# Patient Record
Sex: FEMALE | Race: WHITE
[De-identification: names, ages, dates, MRNs, and addresses within clinical notes are randomized per-mention and may not be internally consistent; named-entity substitution may affect disease eponyms.]

---

## 2017-08-20 ENCOUNTER — HOSPITAL ENCOUNTER (EMERGENCY)
Dept: HOSPITAL 46 - ED | Age: 62
Discharge: HOME | End: 2017-08-20
Payer: MEDICARE

## 2017-08-20 VITALS — HEIGHT: 65 IN | BODY MASS INDEX: 48.82 KG/M2 | WEIGHT: 293 LBS
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
## 2017-08-20 DIAGNOSIS — I10: ICD-10-CM

## 2017-08-20 DIAGNOSIS — Z90.49: ICD-10-CM

## 2017-08-20 DIAGNOSIS — F17.200: ICD-10-CM

## 2017-08-20 DIAGNOSIS — Z88.2: ICD-10-CM

## 2017-08-20 DIAGNOSIS — E11.9: ICD-10-CM

## 2017-08-20 DIAGNOSIS — Z79.899: ICD-10-CM

## 2017-08-20 DIAGNOSIS — Z98.51: ICD-10-CM

## 2017-08-20 DIAGNOSIS — N39.0: ICD-10-CM

## 2017-08-20 DIAGNOSIS — J45.909: ICD-10-CM

## 2017-08-20 DIAGNOSIS — Z79.4: ICD-10-CM

## 2017-08-20 DIAGNOSIS — Z79.82: ICD-10-CM

## 2017-08-20 DIAGNOSIS — S89.91XA: Primary | ICD-10-CM

## 2017-08-20 PROCEDURE — 0T9B70Z DRAINAGE OF BLADDER WITH DRAINAGE DEVICE, VIA NATURAL OR ARTIFICIAL OPENING: ICD-10-PCS

## 2018-05-30 ENCOUNTER — HOSPITAL ENCOUNTER (EMERGENCY)
Dept: HOSPITAL 46 - ED | Age: 63
Discharge: HOME | End: 2018-05-30
Payer: MEDICARE

## 2018-05-30 VITALS — BODY MASS INDEX: 48.82 KG/M2 | WEIGHT: 293 LBS | HEIGHT: 65 IN

## 2018-05-30 DIAGNOSIS — Z79.899: ICD-10-CM

## 2018-05-30 DIAGNOSIS — Z79.4: ICD-10-CM

## 2018-05-30 DIAGNOSIS — Z79.82: ICD-10-CM

## 2018-05-30 DIAGNOSIS — Z88.2: ICD-10-CM

## 2018-05-30 DIAGNOSIS — G89.29: Primary | ICD-10-CM

## 2018-05-30 DIAGNOSIS — J45.909: ICD-10-CM

## 2018-05-30 DIAGNOSIS — F17.200: ICD-10-CM

## 2018-05-30 DIAGNOSIS — M25.552: ICD-10-CM

## 2018-05-30 DIAGNOSIS — E11.9: ICD-10-CM

## 2018-05-30 DIAGNOSIS — I10: ICD-10-CM

## 2018-07-11 ENCOUNTER — HOSPITAL ENCOUNTER (EMERGENCY)
Dept: HOSPITAL 46 - ED | Age: 63
Discharge: HOME | End: 2018-07-11
Payer: MEDICARE

## 2018-07-11 VITALS — BODY MASS INDEX: 48.82 KG/M2 | HEIGHT: 65 IN | WEIGHT: 293 LBS

## 2018-07-11 DIAGNOSIS — N39.0: Primary | ICD-10-CM

## 2018-07-11 DIAGNOSIS — Z79.899: ICD-10-CM

## 2018-07-11 DIAGNOSIS — Z88.2: ICD-10-CM

## 2018-07-11 DIAGNOSIS — Z79.84: ICD-10-CM

## 2018-07-11 DIAGNOSIS — E11.9: ICD-10-CM

## 2018-07-11 DIAGNOSIS — J45.909: ICD-10-CM

## 2018-07-11 DIAGNOSIS — F17.200: ICD-10-CM

## 2018-07-11 DIAGNOSIS — I10: ICD-10-CM

## 2018-07-11 DIAGNOSIS — J02.9: ICD-10-CM

## 2018-07-11 DIAGNOSIS — Z79.4: ICD-10-CM

## 2018-07-11 DIAGNOSIS — Z79.82: ICD-10-CM

## 2019-01-22 ENCOUNTER — HOSPITAL ENCOUNTER (EMERGENCY)
Dept: HOSPITAL 46 - ED | Age: 64
Discharge: HOME | End: 2019-01-22
Payer: MEDICARE

## 2019-01-22 VITALS — HEIGHT: 65 IN | BODY MASS INDEX: 48.82 KG/M2 | WEIGHT: 293 LBS

## 2019-01-22 DIAGNOSIS — Z79.82: ICD-10-CM

## 2019-01-22 DIAGNOSIS — Z79.899: ICD-10-CM

## 2019-01-22 DIAGNOSIS — E11.9: ICD-10-CM

## 2019-01-22 DIAGNOSIS — I10: ICD-10-CM

## 2019-01-22 DIAGNOSIS — Z79.4: ICD-10-CM

## 2019-01-22 DIAGNOSIS — J40: Primary | ICD-10-CM

## 2019-01-22 DIAGNOSIS — F17.200: ICD-10-CM

## 2019-01-22 DIAGNOSIS — G47.30: ICD-10-CM

## 2019-01-22 DIAGNOSIS — Z88.2: ICD-10-CM

## 2019-12-09 NOTE — NUR
PT CALLED, UP TO BATHROOM WITH SBA.  PT STATES SHE IS FEELING SLIGHTLY SOB,
STATES "WHEN I GET HOT, IT MAKES ME COUGH."  PERSONAL FAN PROVIDED FOR
PATIENT.  SATS REMAIN 98% ON RA.  HR NO LONGER IN AFIB, NOW SINUS ZAHIDA WITH
OCCASIONAL PAC'S.  HR:50'S.

## 2019-12-09 NOTE — NUR
PT ARRIVED TO ROOM 127 @2015 VIA STRETCHER.  PT AMBULATED TO BATHROOM AND THEN
TO BED.  PT STEADY ON FEET.  PT STATES THAT SHE HAS CHRONIC NECK AND BACK
PAIN, DENIES ANY ACUTE PAIN AT THIS TIME.  LUNGS SOUND CLEAR/DIM, SATS %
ON ROOM AIR.  DENIES CHEST PAIN AND SOB.  R.T. IN TO GIVE BREATHING TREATMENT.
MEDS TAKEN WITHOUT ISSUE, SANDWICH BOX PROVIDED.

## 2019-12-10 NOTE — NUR
Patient ambulated to toilet, voids 350 mls. Patient reports feeling "shaky and
fuzzy, just like when my blood sugar is low." BS of 214. Vital signs taken,
blood pressure of 76/45 (54) and a pulse of 50. Dr. Newton notified.

## 2019-12-10 NOTE — NUR
ASSESSMENT COMPLETED, SEE DOCUMENTATION.  LUNGS SOUND CLEAR/DIM, MORE AERATION
NOTED COMPARED TO LAST NIGHT.  DENIES CHEST PAIN AND SOB.  IV PATENT, SALINE
LOCKED.  EVENING MEDICATIONS TAKEN WITHOUT ISSUE.  VITAL SIGNS STABLE.  PT
DENIES NEEDS, CALL LIGHT WITHIN REACH.  DISCUSSED PLAN OF CARE FOR THE NIGHT,
QUESTIONS ANSWERED.

## 2019-12-10 NOTE — NUR
PT CALLED, UP TO BATHROOM TO VOID, PT AMBULATES WITHOUT ASSISTANCE ONCE CABLES
ARE UNPLUGGED.  PT CONTINUES TO DENY PAIN.  LUNGS REMAIN CLEAR/DIM, ROOM AIR.
HR SINUS RHYTHM, OCCASIONAL PAC'S, RATE 60'S.  STANDING WEIGHT OBTAINED.  CALL
LIGHT REMAINS WITHIN REACH, PT DENIES REQUESTS.

## 2019-12-10 NOTE — NUR
Patient ambulated to toilet with 2 person SBA. Patient educated on importance
of "taking it slow" while walking. BM produced. Patient returned to bed with 1
person SBA. Patient states, "I feel less fuzzy than I did before." Denies
further needs at this time, call light within reach.

## 2019-12-10 NOTE — NUR
BLOOD PRESSURES HAVE CONTINUED TO RUN LOW. DR. APPLE UPDATED AGAIN AND 1300
DOSE OF LASIX STILL BEING HELD. 1500 LABS TO BE DRAWN. PT RESTING AND DENIES
FURTHER NEEDS. LAST /43. CONTINUE TO MONITOR.

## 2019-12-10 NOTE — NUR
Patient laying in bed watching tv, alert and awake. Ambulated independently to
toilet, voided 350 mls. Denies SOB or dizziness with ambulation. Returns to
bed independently. Vital signs taken, assessment complete. Lung sounds
diminished throughout all lobes. 2+ edema noted in bilateral lower
extremities. Ice chips provided, denies further needs at this time. Call light
within reach.

## 2019-12-10 NOTE — NUR
Patient laying in bed, lunch delivered. Patient sits up at the edge of the bed
with feet on floor. Vitals taken, blood pressure of 109/50 (65) and pulse of
54. 3 units of insulin given with meal. Patient states "I don't feel fuzzy
anymore." Denies further needs at this time, call light within reach.

## 2019-12-10 NOTE — NUR
ASSESSMENT COMPLETED, SEE DOCUMENTATION.  PT STATES SHE NO LONGER FEELS SOB,
STATES "ONLY WHEN I COUGH."  DENIES CHEST PAIN.  LUNGS REMAIN CLEAR/DIM SPO2
94-95% ON RA.  HR REMAINS SINUS ZAHIDA, RATE 54, WITH OCCASIONAL PAC'S.  PT
DENIES REQUESTS AT THIS TIME, CALL LIGHT WITHIN REACH.

## 2019-12-10 NOTE — NUR
SHIFT REPORT RECEIVED FROM BONY COBB.  PT CALLED, UP TO BATHROOM WITHOUT
ASSISTANCE ONCE CORDS WERE UNPLUGGED, STEADY ON FEET.  PT SLIGHTLY SOB WITH
ACTIVITY, RETURNED TO BED.  PT DENIES PAIN AT THIS TIME.  CALL LIGHT WITHIN
REACH, DENIES REQUESTS AT THIS TIME.

## 2019-12-10 NOTE — NUR
Patient sitting up at edge of bed with feet on floor. AM medications given. IV
site patent. IV magnesium running at 50 mls/hr. Patient denies further needs
at this time, call light within reach.

## 2019-12-10 NOTE — EKG
[facility]
                                    [address]
                                  [place]  [address]
_________________________________________________________________________________________
                                                                 Signed   
 
 
Atrial fibrillation
Low voltage QRS
Cannot rule out Anterior infarct , age undetermined
Abnormal ECG
When compared with ECG of 09-JUN-2017 11:35,
Atrial fibrillation has replaced Sinus rhythm
Minimal criteria for Anterior infarct are now present
Nonspecific T wave abnormality now evident in Inferior leads
Confirmed by REESE APPLE MD ([id number]) on 12/10/2019 7:39:42 AM
 
 
 
 
 
 
 
 
 
 
 
 
 
 
 
 
 
 
 
 
 
 
 
 
 
 
 
 
 
 
 
 
 
 
 
 
    Electronically Signed By: REESE APPLE MD  12/10/19 0740
_________________________________________________________________________________________
PATIENT NAME:     NATHANIEL SALINAS                
MEDICAL RECORD #: [id number]                     Electrocardiogram             
          ACCT #: [id number]  
DATE OF BIRTH:   [birth year]                                       
PHYSICIAN:   REESE APPLE MD                   REPORT #: [id number]
REPORT IS CONFIDENTIAL AND NOT TO BE RELEASED WITHOUT AUTHORIZATION

## 2019-12-10 NOTE — NUR
PATIENT FINISHING HER DESSERT AND TOLERATED DINNER WELL. CHF AND AFIB
EDUCATION PROVIDED AT LENGTH. PT ALSO HAD A GOOD VISIT WITH CHF EDUCATOR. PT'S
SISTER IN ROOM AND ATTENTIVE TO PATIENT. PT'S WEIGHT 302.1 LB AT THIS TIME.

## 2019-12-10 NOTE — NUR
Patient on telephone, sitting up at edge of bed with feet on floor. Saline
locked. 100% of breakfast eaten. Cardiac nurse in room to assess, will return
this afternoon. Patient denies needs at this time, call light within reach.

## 2019-12-10 NOTE — NUR
PATIENT RESTING IN BED AT THIS TIME. PT DENIES FEELING "FUZZY" OR LIGHTHEADED
AT THIS TIME, BUT BLOOD PRESSURES ARE STILL LOW. 1300 DOSE OF LASIX BEING
HELD. WILL CONTINUE TO MONITOR CLOSELY.

## 2019-12-10 NOTE — NUR
Certified Heart Failure Nurse Notes:
Consultation recieved for CHF education. Two unsuccessful attempts to visit
with patient today. Patient known to this service- had initiated cardiac
rehabilitation in July for CABG and MI. Attended a few session patient states
dropped out of program due to agraphobia.  At that time PHQ-9 score was 15.
Will return to talk to patient about heart failure management and to encourage
resuming cardiac rehab if able.
Date of echocardiogram -pending
Admit Wt.:  137.22 kg
Admit BNP: 609

## 2019-12-10 NOTE — NUR
In and spoke with Rhonda.  She lives in [place] in her mom's trailer who is
now .  She lives with her 3 sisters and alternate care for a disabled
sister.  Pt has lost near 100 pounds following CABG in Feb.  Needs CHF
education, started the Cardiac rehab program here, but stopped due to
agoraphobia.  She does cont. to use the foot pedal bike she used there.  She
is unclear what she should eat with CHF and Rn's will enter referral for
Cardiac rehab and dietary. Discussed if pt has seen the Stop light protocol
for CHF and she has, but does not follow currently.  Will need further
education.  Pt states she has long history of asthma, but has not needed home
02 or nebulizer in > 6 months.

## 2019-12-10 NOTE — NUR
Imaging in room to perform ECHO. IV magnesium continues infusing at 50 mls/hr.
Breakfast delivered. Patient denies further needs at this time, call light
within reach.

## 2019-12-10 NOTE — NUR
PT APPEARS TO BE SLEEPING AT THIS TIME.  RESPIRATIONS ARE EVEN AND UNLABORED,
RR: 22, SPO2:94% ON RA, HR:57.  WILL ALLOW FOR REST AND CONTINUE TO MONITOR.

## 2019-12-10 NOTE — NUR
PT AMBULATED TO BATHROOM, VOIDED 400ML AND RETURNED TO BED.  PT TOLERATED
WELL.  NO FURTHER REQUESTS AT THIS TIME.

## 2019-12-11 NOTE — NUR
PT CALLED, UP TO BATHROOM TO VOID 800ML AND THEN RETURNED TO BED, TOLERATED
WELL.  ASSESSMENT COMPLETED, NO CHANGES FROM PREVIOUS.  VITAL SIGNS STABLE.
NO FUTHER REQUESTS, CALL LIGHT WITHIN REACH.

## 2019-12-11 NOTE — NUR
PT SLEEPING SOUNDLY AT THIS TIME, NO APPARENT DISTRESS.  RESPIRATIONS EVEN AND
UNLABORED.  HR: 58, RR: 20.  WILL ALLOW FOR REST AND CONTINUE TO MONITOR.

## 2019-12-11 NOTE — NUR
PT SLEEPING, NO APPARENT DISTRESS.  RESPIRATIONS EVEN AND UNLABORED.  RR:22,
HR:59.  WILL ALLOW FOR REST AND CONTINUE TO MONITOR.

## 2019-12-11 NOTE — NUR
PATIENT UUP AND SITTING AT BEDSIDE THIS AM. DENIES ANY FEELING OF SHORTNESS OF
BREATH OR THE "CHEST TIGHTNESS". PT EAGER TO GO HOME TODAY. PT AMB TO BATHROOM
TO VOID 500 ML. PLAN IS FOR PATIENT TO LIKELY D/C HOME TODAY, AFTER ECHO
RESULTS ARE BACK. BREAKFAST ORDERED. ASSESSMENT COMPLETE. DENIES FURTHER
NEEDS.

## 2019-12-11 NOTE — NUR
ASSESSMENT COMPLETED, NO CHANGES FROM PREVIOUS.  PT UP TO BATHROOM, VOIDED
500ML AND RETURNED TO BED.  PT TOLERATED ACTIVITY WELL.  VITAL SIGNS STABLE.
STANDING WEIGHT THIS MORNIN.4 LBS.  CALL LIGHT WITHIN REACH, PT DENIES
REQUESTS.

## 2019-12-11 NOTE — NUR
PATIENT TO D/C  HOME. PT UP TO BATHROOM TO VOID AGAIN. WAITING ON PHARMACY TO
SEE PATIENT BEFORE D/C.

## 2020-01-31 NOTE — EKG
[facility] [facility]
                                    [address]
                                  [place]  [address]
_________________________________________________________________________________________
                                                                 Signed   
 
 
Normal sinus rhythm
Low voltage QRS
Cannot rule out Anterior infarct (cited on or before 09-DEC-2019)
Abnormal ECG
When compared with ECG of 09-DEC-2019 15:38,
Sinus rhythm has replaced Atrial fibrillation
Confirmed by PEDRO REEDER MD ([id number]) on 1/31/2020 1:31:20 PM
 
 
 
 
 
 
 
 
 
 
 
 
 
 
 
 
 
 
 
 
 
 
 
 
 
 
 
 
 
 
 
 
 
 
 
 
 
 
    Electronically Signed By: PEDRO REEDER MD  01/31/20 1331
_________________________________________________________________________________________
PATIENT NAME:     NATHANIEL SALINAS                
MEDICAL RECORD #: [id number]                     Electrocardiogram             
          ACCT #: [id number]  
DATE OF BIRTH:   [birth year]                                       
PHYSICIAN:   PEDRO REEDER MD           REPORT #: [id number]
REPORT IS CONFIDENTIAL AND NOT TO BE RELEASED WITHOUT AUTHORIZATION
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
## 2021-03-31 NOTE — EKG
[facility]
                                    [address]
                                  [place]  [address]
_________________________________________________________________________________________
                                                                 Signed   
 
 
Atrial fibrillation
Abnormal QRS-T angle, consider primary T wave abnormality
Abnormal ECG
When compared with ECG of 07-MAR-2021 00:05,
Inverted T waves have replaced nonspecific T wave abnormality in Inferior leads
Confirmed by PEDRO REEDER MD ([id number]) on 3/31/2021 1:46:16 PM
 
 
 
 
 
 
 
 
 
 
 
 
 
 
 
 
 
 
 
 
 
 
 
 
 
 
 
 
 
 
 
 
 
 
 
 
 
 
 
    Electronically Signed By: PEDRO REEDER MD  03/31/21 1346
_________________________________________________________________________________________
PATIENT NAME:     NATHANIEL SALINAS                
MEDICAL RECORD #: [id number]                     Electrocardiogram             
          ACCT #: [id number]  
DATE OF BIRTH:   [birth year]                                       
PHYSICIAN:   PEDRO REEDER MD           REPORT #: [id number]
REPORT IS CONFIDENTIAL AND NOT TO BE RELEASED WITHOUT AUTHORIZATION

## 2021-12-20 NOTE — XMS
PreManage Notification: NATHANIEL SALINAS MRN:[id number]
 
Security Information
 
Security Events
No recent Security Events currently on file
 
 
 
CRITERIA MET
------------
- PDMP
 
 
CARE PROVIDERS
-------------------------------------------------------------------------------------
MARILYN COBOS      Internal Medicine: Cardiovascular Disease     12/16/2019-Current
 
PHONE: Unknown
-------------------------------------------------------------------------------------
GAYE CHILDRESS      Physician Assistant     12/16/2019-Current
 
PHONE: [phone]
-------------------------------------------------------------------------------------
 
[place] has no Care Guidelines for this patient.
Care History
Medical/Surgical
03/31/2021    [facility]
 
      - PATIENT HAS A CARDIOLOGIST DR COBOS AT [facility] CARDIOLOGY- [phone] 
      
      - LAST APT WITH CARDIOLOGIST 08/13/2020 NEXT APT SCHEDULED 04/08/2021 
      - RECENT ED VISIT RECORDS SENT TO CARDIOLOGIST FOR REVIEW PER REQUEST.
03/08/2021    [facility]
 
      - PATIENT WAS LAST SEEN BY PCP JANUARY 2021 AND HAD A VIRTUAL VISIT WITH 
      PROVIDER FEBRUARY 2021. PROVIDER OFFICE JUST RECEIVED PATIENT RECENT ED VISIT
      NOTE AND WILL CONTACT PATIENT TODAY FOR A FOLLOW UP APT.
E.DAdrienne VISIT COUNT (12 MO.)
-------------------------------------------------------------------------------------
4 [facility]
-------------------------------------------------------------------------------------
TOTAL 4
-------------------------------------------------------------------------------------
NOTE: Visits indicate total known visits.
 
ED/UCC VISIT TRACKING (12 MO.)
-------------------------------------------------------------------------------------
12/20/2021 17:13
[facility] [place]
 
TYPE: Emergency
 
COMPLAINT:
- VOMITING
-------------------------------------------------------------------------------------
03/30/2021 16:20
[facility] [place]
 
 
TYPE: Emergency
 
COMPLAINT:
- CHEST PAIN
 
DIAGNOSES:
- Other chest pain
- Sleep apnea, unspecified
- Essential (primary) hypertension
- Nicotine dependence, unspecified, uncomplicated
- Other long term (current) drug therapy
- Long term (current) use of anticoagulants
- Long term (current) use of insulin
- Allergy status to sulfonamides
- Nicotine dependence, cigarettes, uncomplicated
- Type 2 diabetes mellitus without complications
-------------------------------------------------------------------------------------
03/06/2021 23:31
[facility] [place]
 
TYPE: Emergency
 
COMPLAINT:
- TIRED, SWEATY, FELL
 
DIAGNOSES:
- Type 2 diabetes mellitus without complications
- Other long term (current) drug therapy
- Long term (current) use of aspirin
- Nicotine dependence, unspecified, uncomplicated
- Essential (primary) hypertension
- Allergy status to sulfonamides
- Syncope and collapse
- Sleep apnea, unspecified
- Long term (current) use of insulin
- Urinary tract infection, site not specified
-------------------------------------------------------------------------------------
01/07/2021 18:57
[facility] [place]
 
TYPE: Emergency
 
COMPLAINT:
- SYNCOPE
 
DIAGNOSES:
- Sleep apnea, unspecified
- Unspecified asthma, uncomplicated
- Long term (current) use of aspirin
- Nicotine dependence, unspecified, uncomplicated
- Fall on same level, unspecified, initial encounter
- Allergy status to sulfonamides
- Unspecified injury of unspecified muscle, fascia and tendon at wrist and hand
level, left hand, initial encounter
- Long term (current) use of insulin
- Other long term (current) drug therapy
- Laceration without foreign body of nose, initial encounter
- Type 2 diabetes mellitus without complications
- Essential (primary) hypertension
 
- Syncope and collapse
-------------------------------------------------------------------------------------
 
 
INPATIENT VISIT TRACKING (12 MO.)
No inpatient visits to display in this time frame
 
[email or web address]

## 2021-12-21 NOTE — EKG
[facility]
                                    [address] [place], [place]  [address]
_________________________________________________________________________________________
                                                                 Signed   
 
 
Atrial fibrillation
Abnormal ECG
When compared with ECG of 30-MAR-2021 16:24,
No significant change was found
Confirmed by PEDRO REEDER MD ([id number]) on 12/21/2021 9:05:43 AM
 
 
 
 
 
 
 
 
 
 
 
 
 
 
 
 
 
 
 
 
 
 
 
 
 
 
 
 
 
 
 
 
 
 
 
 
 
 
 
 
    Electronically Signed By: PEDRO REEDER MD  12/21/21 0905
_________________________________________________________________________________________
PATIENT NAME:     NATHANIEL SALINAS [place]                
MEDICAL RECORD #: [id number]                     Electrocardiogram             
          ACCT #: [id number]  
DATE OF BIRTH:   [birth year]                                       
PHYSICIAN:   PEDRO REEDER MD           REPORT #: [id number]
REPORT IS CONFIDENTIAL AND NOT TO BE RELEASED WITHOUT AUTHORIZATION

## 2022-05-04 NOTE — XMS
PreManage Notification: NATHANIEL SALINAS MRN:[id number]
 
Security Information
 
Security Events
No recent Security Events currently on file
 
 
 
CRITERIA MET
------------
- PDMP
 
 
CARE PROVIDERS
-------------------------------------------------------------------------------------
MARILYN COBOS      Internal Medicine: Cardiovascular Disease     12/16/2019-Current
 
PHONE: Unknown
-------------------------------------------------------------------------------------
GAYE CHILDRESS      Physician Assistant     12/21/2021-Current
 
PHONE: [phone]
-------------------------------------------------------------------------------------
 
[place] has no Care Guidelines for this patient.
Care History
Medical/Surgical
03/31/2021    [facility]
 
      - PATIENT HAS A CARDIOLOGIST DR COBOS AT [facility] CARDIOLOGY- [phone] 
      
      - LAST APT WITH CARDIOLOGIST 08/13/2020 NEXT APT SCHEDULED 04/08/2021 
      - RECENT ED VISIT RECORDS SENT TO CARDIOLOGIST FOR REVIEW PER REQUEST.
03/08/2021    [facility]
 
      - PATIENT WAS LAST SEEN BY PCP JANUARY 2021 AND HAD A VIRTUAL VISIT WITH 
      PROVIDER FEBRUARY 2021. PROVIDER OFFICE JUST RECEIVED PATIENT RECENT ED VISIT
      NOTE AND WILL CONTACT PATIENT TODAY FOR A FOLLOW UP APT.
E.DAdrienne VISIT COUNT (12 MO.)
-------------------------------------------------------------------------------------
2 [facility]
-------------------------------------------------------------------------------------
TOTAL 2
-------------------------------------------------------------------------------------
NOTE: Visits indicate total known visits.
 
ED/UCC VISIT TRACKING (12 MO.)
-------------------------------------------------------------------------------------
05/04/2022 09:35
[facility] [place]
 
TYPE: Emergency
 
COMPLAINT:
- POST SURGERY BLEEDING
-------------------------------------------------------------------------------------
12/20/2021 17:13
[facility] [place]
 
 
TYPE: Emergency
 
COMPLAINT:
- VOMITING
 
DIAGNOSES:
- Unspecified asthma, uncomplicated
- Unspecified atrial fibrillation
- Essential (primary) hypertension
- Other long term (current) drug therapy
- Nicotine dependence, unspecified, uncomplicated
- Epigastric pain
- Long term (current) use of oral hypoglycemic drugs
- Right upper quadrant pain
- Allergy status to sulfonamides
- Type 2 diabetes mellitus without complications
- Sleep apnea, unspecified
- Headache, unspecified
- Long term (current) use of insulin
- Long term (current) use of anticoagulants
-------------------------------------------------------------------------------------
 
 
INPATIENT VISIT TRACKING (12 MO.)
-------------------------------------------------------------------------------------
05/03/2022 06:50
[facility] [place]
 
TYPE: Surgery
 
DIAGNOSES:
- Primary osteoarthritis, right shoulder
-------------------------------------------------------------------------------------
 
[email or web address]

## 2022-05-19 NOTE — XMS
PreManage Notification: NATHANIEL SALINAS MRN:[id number]
 
Security Information
 
Security Events
No recent Security Events currently on file
 
 
 
CRITERIA MET
------------
- [facility]
- [facility] - 2 Visits in 30 Days
 
 
CARE PROVIDERS
-------------------------------------------------------------------------------------
MARILYN COBOS      Internal Medicine: Cardiovascular Disease     12/16/2019-Current
 
PHONE: Unknown
-------------------------------------------------------------------------------------
GAYE CHILDRESS      Physician Assistant     12/21/2021-Current
 
PHONE: [phone]
-------------------------------------------------------------------------------------
 
Minor has no Care Guidelines for this patient.
Care History
Medical/Surgical
03/31/2021    [facility]
 
      - PATIENT HAS A CARDIOLOGIST DR COBOS AT [facility] CARDIOLOGY- [phone] 
      
      - LAST APT WITH CARDIOLOGIST 08/13/2020 NEXT APT SCHEDULED 04/08/2021 
      - RECENT ED VISIT RECORDS SENT TO CARDIOLOGIST FOR REVIEW PER REQUEST.
03/08/2021    [facility]
 
      - PATIENT WAS LAST SEEN BY PCP JANUARY 2021 AND HAD A VIRTUAL VISIT WITH 
      PROVIDER FEBRUARY 2021. PROVIDER OFFICE JUST RECEIVED PATIENT RECENT ED VISIT
      NOTE AND WILL CONTACT PATIENT TODAY FOR A FOLLOW UP APT.
E.D. VISIT COUNT (12 MO.)
-------------------------------------------------------------------------------------
3 [facility]
-------------------------------------------------------------------------------------
TOTAL 3
-------------------------------------------------------------------------------------
NOTE: Visits indicate total known visits.
 
ED/UCC VISIT TRACKING (12 MO.)
-------------------------------------------------------------------------------------
05/19/2022 03:15
[facility] [place]
 
TYPE: Emergency
 
COMPLAINT:
- POST OP PROBLEM
-------------------------------------------------------------------------------------
05/04/2022 09:35
 
[facility] [place]
 
TYPE: Emergency
 
COMPLAINT:
- POST SURGERY BLEEDING
 
DIAGNOSES:
- Allergy status to sulfonamides
- Nicotine dependence, unspecified, uncomplicated
- Postprocedural hemorrhage of a musculoskeletal structure following a
musculoskeletal system procedure
- Type 2 diabetes mellitus without complications
- Long term (current) use of anticoagulants
- Long term (current) use of aspirin
- Essential (primary) hypertension
- Long term (current) use of oral hypoglycemic drugs
- Long term (current) use of insulin
- Other long term (current) drug therapy
- Unspecified atrial fibrillation
- Sleep apnea, unspecified
-------------------------------------------------------------------------------------
12/20/2021 17:13
[facility] [place]
 
TYPE: Emergency
 
COMPLAINT:
- VOMITING
 
DIAGNOSES:
- Unspecified asthma, uncomplicated
- Unspecified atrial fibrillation
- Essential (primary) hypertension
- Other long term (current) drug therapy
- Nicotine dependence, unspecified, uncomplicated
- Epigastric pain
- Long term (current) use of oral hypoglycemic drugs
- Right upper quadrant pain
- Allergy status to sulfonamides
- Type 2 diabetes mellitus without complications
- Sleep apnea, unspecified
- Headache, unspecified
- Long term (current) use of insulin
- Long term (current) use of anticoagulants
-------------------------------------------------------------------------------------
 
 
INPATIENT VISIT TRACKING (12 MO.)
-------------------------------------------------------------------------------------
05/17/2022 13:50
[facility] [place]
 
TYPE: Surgery
 
COMPLAINT:
- SHOULDER ARTHROPLASTY, TOTAL, REVISION
 
DIAGNOSES:
- SHOULDER ARTHROPLASTY, TOTAL, REVISION
 
-------------------------------------------------------------------------------------
05/03/2022 06:50
[facility] [place]
 
TYPE: Surgery
 
DIAGNOSES:
- Primary osteoarthritis, right shoulder
-------------------------------------------------------------------------------------
 
[email or web address]

## 2023-02-03 NOTE — XMS
PreManage Notification: NATHANIEL SALINAS MRN:[id number]
 
Security Information
 
Security Events
No recent Security Events currently on file
 
 
 
CRITERIA MET
------------
- [facility]
 
 
CARE PROVIDERS
-------------------------------------------------------------------------------------
MARILYN COBOS      Internal Medicine: Cardiovascular Disease     12/16/2019-Current
 
PHONE: Unknown
-------------------------------------------------------------------------------------
GAYE CHILDRESS      Physician Assistant     12/21/2021-Current
 
PHONE: [phone]
-------------------------------------------------------------------------------------
 
Minor has no Care Guidelines for this patient.
Care History
Medical/Surgical
03/31/2021    [facility]
 
      - PATIENT HAS A CARDIOLOGIST DR COBOS AT [facility] CARDIOLOGY- [phone] 
      
      - LAST APT WITH CARDIOLOGIST 08/13/2020 NEXT APT SCHEDULED 04/08/2021 
      - RECENT ED VISIT RECORDS SENT TO CARDIOLOGIST FOR REVIEW PER REQUEST.
03/08/2021    [facility]
 
      - PATIENT WAS LAST SEEN BY PCP JANUARY 2021 AND HAD A VIRTUAL VISIT WITH 
      PROVIDER FEBRUARY 2021. PROVIDER OFFICE JUST RECEIVED PATIENT RECENT ED VISIT
      NOTE AND WILL CONTACT PATIENT TODAY FOR A FOLLOW UP APT.
E.DAdrienne VISIT COUNT (12 MO.)
-------------------------------------------------------------------------------------
3 [facility]
-------------------------------------------------------------------------------------
TOTAL 3
-------------------------------------------------------------------------------------
NOTE: Visits indicate total known visits.
 
ED/UCC VISIT TRACKING (12 MO.)
-------------------------------------------------------------------------------------
02/03/2023 10:37
[facility] [place]
 
TYPE: Emergency
 
COMPLAINT:
- COUGH, CHEST PRESSURE, DIZZY
-------------------------------------------------------------------------------------
05/19/2022 03:15
[facility] [place]
 
 
TYPE: Emergency
 
COMPLAINT:
- POST OP PROBLEM
 
DIAGNOSES:
- Other long term (current) drug therapy
- Allergy status to sulfonamides
- Long term (current) use of anticoagulants
- Other complications of procedures, not elsewhere classified, initial encounter
- Type 2 diabetes mellitus without complications
- Long term (current) use of aspirin
- Other intraoperative and postprocedural complications and disorders of the
musculoskeletal system
- Long term (current) use of insulin
- Allergy status to other drugs, medicaments and biological substances
- Sleep apnea, unspecified
- Essential (primary) hypertension
- Nicotine dependence, unspecified, uncomplicated
- Unspecified atrial fibrillation
- Unspecified asthma, uncomplicated
- Restless legs syndrome
-------------------------------------------------------------------------------------
05/04/2022 09:35
[facility] [place]
 
TYPE: Emergency
 
COMPLAINT:
- POST SURGERY BLEEDING
 
DIAGNOSES:
- Sleep apnea, unspecified
- Postprocedural hemorrhage of a musculoskeletal structure following a
musculoskeletal system procedure
- Other long term (current) drug therapy
- Allergy status to sulfonamides
- Long term (current) use of oral hypoglycemic drugs
- Long term (current) use of aspirin
- Type 2 diabetes mellitus without complications
- Unspecified atrial fibrillation
- Nicotine dependence, unspecified, uncomplicated
- Long term (current) use of insulin
- Essential (primary) hypertension
- Long term (current) use of anticoagulants
-------------------------------------------------------------------------------------
 
 
INPATIENT VISIT TRACKING (12 MO.)
-------------------------------------------------------------------------------------
05/17/2022 13:50
[facility] [place]
 
TYPE: Surgery
 
COMPLAINT:
- SHOULDER ARTHROPLASTY, TOTAL, REVISION
 
DIAGNOSES:
 
- SHOULDER ARTHROPLASTY, TOTAL, REVISION
-------------------------------------------------------------------------------------
05/03/2022 06:50
[facility]
 
[facility]: [facility]
 
DIAGNOSES:
- Primary osteoarthritis, right shoulder
-------------------------------------------------------------------------------------
 
[email or web address]

## 2023-02-05 NOTE — EKG
[facility]
                                    [address]
                                  [place]  [address]
_________________________________________________________________________________________
                                                                 Signed   
 
 
Atrial fibrillation
Nonspecific T wave abnormality
Abnormal ECG
When compared with ECG of 20-DEC-2021 19:00,
Nonspecific T wave abnormality now evident in Anterior leads
Confirmed by Levy Campos MD ([id number]) on 2/5/2023 12:22:53 PM
 
 
 
 
 
 
 
 
 
 
 
 
 
 
 
 
 
 
 
 
 
 
 
 
 
 
 
 
 
 
 
 
 
 
 
 
 
 
 
    Electronically Signed By: LEVY CAMPOS MD  02/05/23 1223
_________________________________________________________________________________________
PATIENT NAME:     NATHANIEL SALINAS                
MEDICAL RECORD #: [id number]                     Electrocardiogram             
          ACCT #: [id number]  
DATE OF BIRTH:   [birth year]                                       
PHYSICIAN:   LEVY CAMPOS MD                 REPORT #: [id number]
REPORT IS CONFIDENTIAL AND NOT TO BE RELEASED WITHOUT AUTHORIZATION

## 2023-02-09 NOTE — XMS
PreManage Notification: NATHANIEL SALINAS MRN:[id number]
 
Security Information
 
Security Events
No recent Security Events currently on file
 
 
 
CRITERIA MET
------------
- [facility] - 2 Visits in 30 Days
- [facility]
 
 
CARE PROVIDERS
-------------------------------------------------------------------------------------
MARILYN COBOS      Internal Medicine: Cardiovascular Disease     12/16/2019-Current
 
PHONE: Unknown
-------------------------------------------------------------------------------------
GAYE CHILDRESS      Physician Assistant     12/21/2021-Current
 
PHONE: [phone]
-------------------------------------------------------------------------------------
 
[place]or has no Care Guidelines for this patient.
Care History
Medical/Surgical
03/31/2021    [facility]
 
      - PATIENT HAS A CARDIOLOGIST DR COBOS AT [facility] CARDIOLOGY- [phone] 
      
      - LAST APT WITH CARDIOLOGIST 08/13/2020 NEXT APT SCHEDULED 04/08/2021 
      - RECENT ED VISIT RECORDS SENT TO CARDIOLOGIST FOR REVIEW PER REQUEST.
03/08/2021    [facility]
 
      - PATIENT WAS LAST SEEN BY PCP JANUARY 2021 AND HAD A VIRTUAL VISIT WITH 
      PROVIDER FEBRUARY 2021. PROVIDER OFFICE JUST RECEIVED PATIENT RECENT ED VISIT
      NOTE AND WILL CONTACT PATIENT TODAY FOR A FOLLOW UP APT.
E.D. VISIT COUNT (12 MO.)
-------------------------------------------------------------------------------------
4 [facility]
-------------------------------------------------------------------------------------
TOTAL 4
-------------------------------------------------------------------------------------
NOTE: Visits indicate total known visits.
 
ED/UCC VISIT TRACKING (12 MO.)
-------------------------------------------------------------------------------------
02/09/2023 13:44
[facility] [place]
 
TYPE: Emergency
 
COMPLAINT:
- COUGH
-------------------------------------------------------------------------------------
02/03/2023 10:37
 
[facility] [place]
 
TYPE: Emergency
 
COMPLAINT:
- COUGH, CHEST PRESSURE, DIZZY
 
DIAGNOSES:
- Contact with and (suspected) exposure to COVID-19
- Essential (primary) hypertension
- Unspecified asthma, uncomplicated
- Long term (current) use of aspirin
- Allergy status to other drugs, medicaments and biological substances
- Other long term (current) drug therapy
- Sleep apnea, unspecified
- Unspecified atrial fibrillation
- Cough, unspecified
- Allergy status to sulfonamides
- Nicotine dependence, unspecified, uncomplicated
- Acute upper respiratory infection, unspecified
- Long term (current) use of anticoagulants
- Long term (current) use of insulin
- Type 2 diabetes mellitus without complications
-------------------------------------------------------------------------------------
05/19/2022 03:15
[facility] [place]
 
TYPE: Emergency
 
COMPLAINT:
- POST OP PROBLEM
 
DIAGNOSES:
- Long term (current) use of anticoagulants
- Other complications of procedures, not elsewhere classified, initial encounter
- Type 2 diabetes mellitus without complications
- Long term (current) use of aspirin
- Other intraoperative and postprocedural complications and disorders of the
musculoskeletal system
- Long term (current) use of insulin
- Allergy status to other drugs, medicaments and biological substances
- Sleep apnea, unspecified
- Essential (primary) hypertension
- Nicotine dependence, unspecified, uncomplicated
- Unspecified atrial fibrillation
- Unspecified asthma, uncomplicated
- Restless legs syndrome
- Other long term (current) drug therapy
- Allergy status to sulfonamides
-------------------------------------------------------------------------------------
05/04/2022 09:35
[facility] [place]
 
TYPE: Emergency
 
COMPLAINT:
- POST SURGERY BLEEDING
 
DIAGNOSES:
- Other long term (current) drug therapy
 
- Allergy status to sulfonamides
- Long term (current) use of oral hypoglycemic drugs
- Long term (current) use of aspirin
- Type 2 diabetes mellitus without complications
- Unspecified atrial fibrillation
- Nicotine dependence, unspecified, uncomplicated
- Long term (current) use of insulin
- Essential (primary) hypertension
- Long term (current) use of anticoagulants
- Sleep apnea, unspecified
- Postprocedural hemorrhage of a musculoskeletal structure following a
musculoskeletal system procedure
-------------------------------------------------------------------------------------
 
 
INPATIENT VISIT TRACKING (12 MO.)
-------------------------------------------------------------------------------------
05/17/2022 13:50
[facility] [place]
 
TYPE: Surgery
 
COMPLAINT:
- SHOULDER ARTHROPLASTY, TOTAL, REVISION
 
DIAGNOSES:
- SHOULDER ARTHROPLASTY, TOTAL, REVISION
-------------------------------------------------------------------------------------
05/03/2022 06:50
[facility] [place]
 
TYPE: Surgery
 
DIAGNOSES:
- Primary osteoarthritis, right shoulder
-------------------------------------------------------------------------------------
 
[email or web address]

## 2023-02-20 NOTE — XMS
PreManage Notification: NATHANIEL SALINAS MRN:[id number]
 
Security Information
 
Security Events
No recent Security Events currently on file
 
 
 
CRITERIA MET
------------
- [facility] - 2 Visits in 30 Days
- [facility]
 
 
CARE PROVIDERS
-------------------------------------------------------------------------------------
MARILYN COBOS      Internal Medicine: Cardiovascular Disease     12/16/2019-Current
 
PHONE: Unknown
-------------------------------------------------------------------------------------
GAYE CHILDRESS      Physician Assistant     12/21/2021-Current
 
PHONE: [phone]
-------------------------------------------------------------------------------------
 
Min[place] has no Care Guidelines for this patient.
Care History
Medical/Surgical
03/31/2021    [facility]
 
      - PATIENT HAS A CARDIOLOGIST DR COBOS AT [facility] CARDIOLOGY- [phone] 
      
      - LAST APT WITH CARDIOLOGIST 08/13/2020 NEXT APT SCHEDULED 04/08/2021 
      - RECENT ED VISIT RECORDS SENT TO CARDIOLOGIST FOR REVIEW PER REQUEST.
03/08/2021    [facility]
 
      - PATIENT WAS LAST SEEN BY PCP JANUARY 2021 AND HAD A VIRTUAL VISIT WITH 
      PROVIDER FEBRUARY 2021. PROVIDER OFFICE JUST RECEIVED PATIENT RECENT ED VISIT
      NOTE AND WILL CONTACT PATIENT TODAY FOR A FOLLOW UP APT.
E.D. VISIT COUNT (12 MO.)
-------------------------------------------------------------------------------------
5 [facility]
-------------------------------------------------------------------------------------
TOTAL 5
-------------------------------------------------------------------------------------
NOTE: Visits indicate total known visits.
 
ED/UCC VISIT TRACKING (12 MO.)
-------------------------------------------------------------------------------------
02/20/2023 21:42
[facility] [place]
 
TYPE: Emergency
 
COMPLAINT:
- CONFUSION,WEAKNESS
-------------------------------------------------------------------------------------
02/09/2023 13:44
 
[facility] [place]
 
TYPE: Emergency
 
COMPLAINT:
- COUGH
 
DIAGNOSES:
- Chronic obstructive pulmonary disease with (acute) exacerbation
- Cough, unspecified
- Other long term (current) drug therapy
- Essential (primary) hypertension
- Allergy status to sulfonamides
- Long term (current) use of aspirin
- Long term (current) use of insulin
- Sleep apnea, unspecified
- Type 2 diabetes mellitus without complications
- Allergy status to other drugs, medicaments and biological substances
- Nicotine dependence, unspecified, uncomplicated
-------------------------------------------------------------------------------------
02/03/2023 10:37
[facility] [place]
 
TYPE: Emergency
 
COMPLAINT:
- COUGH, CHEST PRESSURE, DIZZY
 
DIAGNOSES:
- Essential (primary) hypertension
- Unspecified asthma, uncomplicated
- Long term (current) use of aspirin
- Allergy status to other drugs, medicaments and biological substances
- Other long term (current) drug therapy
- Sleep apnea, unspecified
- Unspecified atrial fibrillation
- Cough, unspecified
- Allergy status to sulfonamides
- Nicotine dependence, unspecified, uncomplicated
- Acute upper respiratory infection, unspecified
- Long term (current) use of anticoagulants
- Long term (current) use of insulin
- Type 2 diabetes mellitus without complications
- Contact with and (suspected) exposure to COVID-19
-------------------------------------------------------------------------------------
05/19/2022 03:15
[facility] [place]
 
TYPE: Emergency
 
COMPLAINT:
- POST OP PROBLEM
 
DIAGNOSES:
- Other complications of procedures, not elsewhere classified, initial encounter
- Type 2 diabetes mellitus without complications
- Long term (current) use of aspirin
- Other intraoperative and postprocedural complications and disorders of the
musculoskeletal system
- Long term (current) use of insulin
 
- Allergy status to other drugs, medicaments and biological substances
- Sleep apnea, unspecified
- Essential (primary) hypertension
- Nicotine dependence, unspecified, uncomplicated
- Unspecified atrial fibrillation
- Unspecified asthma, uncomplicated
- Restless legs syndrome
- Other long term (current) drug therapy
- Allergy status to sulfonamides
- Long term (current) use of anticoagulants
-------------------------------------------------------------------------------------
05/04/2022 09:35
[facility] [place]
 
TYPE: Emergency
 
COMPLAINT:
- POST SURGERY BLEEDING
 
DIAGNOSES:
- Allergy status to sulfonamides
- Long term (current) use of oral hypoglycemic drugs
- Long term (current) use of aspirin
- Type 2 diabetes mellitus without complications
- Unspecified atrial fibrillation
- Nicotine dependence, unspecified, uncomplicated
- Long term (current) use of insulin
- Essential (primary) hypertension
- Long term (current) use of anticoagulants
- Sleep apnea, unspecified
- Postprocedural hemorrhage of a musculoskeletal structure following a
musculoskeletal system procedure
- Other long term (current) drug therapy
-------------------------------------------------------------------------------------
 
 
INPATIENT VISIT TRACKING (12 MO.)
-------------------------------------------------------------------------------------
05/17/2022 13:50
[facility] [place]
 
TYPE: Surgery
 
COMPLAINT:
- SHOULDER ARTHROPLASTY, TOTAL, REVISION
 
DIAGNOSES:
- SHOULDER ARTHROPLASTY, TOTAL, REVISION
-------------------------------------------------------------------------------------
05/03/2022 06:50
[facility] [place]
 
TYPE: Surgery
 
DIAGNOSES:
- Primary osteoarthritis, right shoulder
-------------------------------------------------------------------------------------
 
[email or web address]

## 2023-02-21 NOTE — NUR
pt SLEEPING, AWAKENS TO VOICE. VSS. DENIES TOILETING NEEDS. INSTRUCTED TO CALL
BEFORE OUT OF BED. CALL LIGHT IN REACH. BED ALARM ON.

## 2023-02-21 NOTE — NUR
IN ROOM WITH PT. MORNING MEDICATION GIVEN. PT RESTING IN BED WORKING WITH
OCCUPATIONAL THERAPIST IRINEO. PT HAS CALL LIGHT WITHIN REACH AND BEDRAIL UP FOR
SAFETY. PT DENIES NEEDS AT THIS TIME.

## 2023-02-21 NOTE — NUR
call light answered, pt reports pain in her feet and states, "it feels like
there's no cushion in my feet". pt medicated by primary rn regarding
neuropathy/rls, unable to have prn tylenol until approx 2300, pt verbalized
understanding. pt declined warm blankets wrapping ble, stating, "it hurts if
anything touches them". ble elevated in bed with pillows x2 and warm blanket
provided with lower portion hanging over the foot of the bed as not to touch
pt's feet. no additional needs, bed alarm on and primary rn aware. call light
in reach. prn melatonin offered, but declined.

## 2023-02-21 NOTE — NUR
PATIENT LAYING IN BED. VITALS AND I&O'S CHARTED. PATIENT COMPLAINED OF HER
LEGS ACHING AND ASKED IF THEIR WAS ANYTHING SHE COULD HAVE FOR THAT, RN
NOTIFIED. CALL LIGHT IN REACH. NO FURTHER NEEDS AT THIS TIME.

## 2023-02-21 NOTE — EKG
[facility]
                                    [address]
                                  [place]  [address]
_________________________________________________________________________________________
                                                                 Signed   
 
 
Atrial fibrillation
Low voltage QRS
Nonspecific ST and T wave abnormality
Abnormal ECG
When compared with ECG of 03-FEB-2023 12:02,
T wave inversion now evident in Lateral leads
Confirmed by Levy Campos MD ([id number]) on 2/21/2023 9:04:49 AM
 
 
 
 
 
 
 
 
 
 
 
 
 
 
 
 
 
 
 
 
 
 
 
 
 
 
 
 
 
 
 
 
 
 
 
 
 
 
    Electronically Signed By: LEVY CAMPOS MD  02/21/23 0904
_________________________________________________________________________________________
PATIENT NAME:     NATHANIEL SALINAS                
MEDICAL RECORD #: [id number]                     Electrocardiogram             
          ACCT #: [id number]  
DATE OF BIRTH:   [birth year]                                       
PHYSICIAN:   LEVY CAMPOS MD                 REPORT #: [id number]
REPORT IS CONFIDENTIAL AND NOT TO BE RELEASED WITHOUT AUTHORIZATION

## 2023-02-21 NOTE — NUR
PATIENT ASSESMENT COMPLETED. PATIENTS VITALS TAKEN AND RECORDED. INTAKE AND
OUTPUT RECORDED. PATIENTS BS TAKEN AND RECORDED. PATIENTS PM MEDS GIVEN PER
ORDER. PATIENT REPORTS PAIN IN HER LOW EXT THAT IS CHRONIC, SHEDULED MEDS
GIVEN PER ORDER. PATIENTS IV FLUSHED AND SL PER ORDER. PATIENT PROVIDED WITH
FRESH ICE WATER. PATIENT DENIES ANY FURTHER NEEDS NOTED. CALL LIGHT IN REACH.
PATIENT IS FORGETFUL AT TIMES. BED ALARM PLACED ON FOR SAFETY.

## 2023-02-21 NOTE — NUR
RECEIVED REPORT FROM DAY SHIFT RN. PATIENT IS RESTING IN BED WATCHING TV.
PATIENT DENIES ANY NEEDS AT THIS TIME. CALL LIGHT IN REACH.

## 2023-02-21 NOTE — NUR
PT SITTING ON BED talking on phone. pt states she feels like she might have
rice stuck where her uvula removed. states she will gargle with her water.

## 2023-02-21 NOTE — NUR
CHECKED ON pt. RESTING IN BED AWAKE. ASSISTED pt WITH TV CHANNELS, GUIDE
PROVIDED. LIGHTS OFF IN ROOM. NO ADDITIONAL REQUESTS. BED ALARM ON.

## 2023-02-21 NOTE — NUR
pt ARRIVES FROM ER VIA WHEELCHAIR WITH ER RN. AMBULATORY TO STANDING SCALE AND
HOSPITAL BED. ASSESSMENT COMPLETE. ALERT AND ORIENTED. NIH SCORE 1. ATAXIA
LEFT ARM. BEDSIDE SWALLOW EVAL WNL. PO SNACK PROVIDED. BED ALARM ON. pt
ORIENTED TO CALL LIGHT AND HOSPITAL BED, ROOM.

## 2023-02-21 NOTE — NUR
ADMININSTER INSULIN WITH RN STUDENT UMA. PT TOLERATED WELL AND STATES THAT
SHE HAS NOT FELT WELL ENOUGH THE LAST FEW DAYS TO TAKE HER INSULIN. EXPLAINED
THAT HER A1C IS EXCEPTIONALLY HIGH AND THAT WOULD BE FROM MORE TIME THAN A
COUPLE OF DAYS. PT STATES SHE DOES NOT WATCH HER DIET BUT IS AWARE SHE
SHOUL.D. EDUCATION GIVEN.

## 2023-02-21 NOTE — NUR
TOOK PATIENT A LIST OF NURSING HOMES IN THE AREA. PATIENT WANTS TO TALK TO HER
FAMILY ABOUT PLACEMENT TO SNF OR GOING HOME TO [place] WHERE SHE LIVES
WITH HER SISTER. GENE HAS AGORA PHOBIA AND FEELS SAFE AT HOME IN HER
[place].

## 2023-02-21 NOTE — NUR
PHONE CALL TO MD, UPDATED ON MORNING BLOOD GLUCOSE WITH LAB DRAW. TELEPHONE
ORDER RECIEVED FOR INSULIN, REPEATED BACK TO VERIFY. EMAR UPDATED. UPDATED ON
URINE RESULTS. MD TO EVALUATE pt.

## 2023-02-22 NOTE — NUR
ROUNDED ON pt, pt RESTING IN BED WITH EYES CLOSED, ON RA. RR EVEN AND
UNLABORED. NO DISTRESS NOTED, BED ALARM ON AND CALL LIGHT IN REACH.

## 2023-02-22 NOTE — NUR
PATIENTS VITALS TAKEN AND RECORDED. PATIENT UP TO BR AS A SBA W/FWW. PATIENT
ABLE TO VOID. PATIENT IS BACK IN BED RESTING. INTAKE AND OUTPUT RECORDED.
FRESH ICE WATER PROVIDED. IV FLUSHED AND SL PER ORDER. PATIENT DENIES ANY
FURTHER NEEDS. CALL LIGHT IN REACH. BED ALARM ON FOR SAFETY.

## 2023-02-22 NOTE — NUR
dr kaur made aware of evening accucheck result of 399-taken by rn sheoshe per
md parameters. pt received insulin ss and scheduled evening long acting
insulin. no new orders received at this time, charge rafat peter also updated.

## 2023-02-22 NOTE — NUR
PATIENTS VITALS TAKEN AND RECORDED. INTAKE AND OUTPUT RECORDED. PATIENT RATES
PAIN IN HER BILAT LOW EXT AT A 5/10, PRN MEDICATION GIVEN PER ORDER. FRESH ICE
WATER PROVIDED. NO FURTHER NEEDS NOTED. CALL LIGHT IN REACH. BED ALARM ON FOR
SAFETY.

## 2023-02-22 NOTE — NUR
REPORT RECEIVED FROM NIGHT RN AND PT CARE RESUMED. PT. IS ALERT AND ORIENTED
TO ALL BUT PLACE. SHE IS FORGETFULL AND REPEATEDLY TELLS THE SAME STORY. SHE
C/O PAIN IN RLE WITH MOVEMENT. SHE REPEATEDLY STATES SHE THINKS SHE HIT IT ON
HER BED AT HOME. NO BRUISING, REDNESS OR SWELLING NOTED AT EXTREMITY.
ASSESSMENT COMPLETED AND MEDS ADMIN. BED ALARM ON. PT. LEFT RESTING WITH CALL
LIGHT IN REACH.

## 2023-02-22 NOTE — NUR
PT RESTING IN BED WITH EYES CLOSED ON LEFT SIDE. RESPIRATIONS ARE EVEN AND
UNLABORED. CALL LIGHT IN REACH.

## 2023-02-22 NOTE — NUR
SHIFT REPORT RECEIVED FROM DAYSHIFT RN MURRAY AT BEDSIDE, BED ALARM ON AND FAMILY
IN ROOM. NO NEEDS OR CONCERNS VERBALIZED, pt ON RA. RR EVEN AND UNLABORED,
CALL LIGHT IN REACH AND BOARD UPDATED. POC DISCUSSED.

## 2023-02-22 NOTE — NUR
ROUNDED ON pt, pt RESTING IN BED, EYES CLOSED ON RA. RR EVEN AND UNLABORED. NO
DISTRESS NOTED. BED ALARM ON FOR SAFETY. CALL LIGHT IN REACH.

## 2023-02-22 NOTE — NUR
vs and i&o's collected, pt up to void-voided 300mls urine. pt back to bed,
slow moving. no additional needs or concerns, call light in reach and bed
alarm resumed.

## 2023-02-22 NOTE — NUR
PATIENT IS SITTING UP IN BED EATING. PATIENT STATES SHE IS NOT INTERESTED IN
GOING TO A SNF. PATIENT SAID SHE TALKED TO HER SISTER AND SHE IS WILLING TO
HELP HER AS NEEDED SINCE HER SISTER LIVES WITH THE PATIENT. DISCUSSED
PATIENT'S AGORA PHOBA. PATIENT REFUSES HELP FOR HER AGRA PHOBA.

## 2023-02-22 NOTE — NUR
CBG TAKEN BY THIS CNA, REPORTED TO RN. PT WAS LYING IN BED AND COMPLAINED
ABOUT PAIN IN R LEG AND REFUSED TO GET UP IN TO CHAIR. RN NOTIFIED.

## 2023-02-22 NOTE — NUR
PATIENT IS RESTING IN BED WITH EYES CLOSED. PATIENT IS ON TELE #2 AND HR IS
70. CALL LIGHT IN REACH. BED ALARM ON FOR SAFETY.

## 2023-02-22 NOTE — NUR
ASSESSMENT COMPLETE, pt DROWSY BUT AWAKENS TO VOICE. VSS, pt ON RA. pt DENEIS
PAIN AND NAUSEA, REPORTS BASELINE NUMBNESS AND TINGLING TO BLE, CHRONIC PER
pt. pt RECENTLY MEDICATED WITH EVENING MEDS BY BONY CAZARES. IV SITE WNL, SALINE
LOCKED. pt REPORTS NEED TO VOID, pt UP SBA WITH FWW TO VOID. BONY CAZARES IN
ROOM TO ASSIST pt BACK TO BED.

## 2023-02-22 NOTE — NUR
SHIFT REPORT RECEIVED FROM ZAID GAO. THIS RN ROUNDED ON pt AT 1945, pt
REPORTS CONTINUED NAUSEA AND STATED, "I ASKED THEM EARLIER TO PUT ME OUT". POC
DISCUSSED WITH pt. THERAPEUTIC COMMUNICATION PROVIDED. CALL LIGHT IN REACH,
WILL MONITOR. pt RECENTLY MEDICATED WITH PRN NAUSEA MEDICATION.

## 2023-02-23 NOTE — NUR
Notified by staff, pt has a clot in her leg and maybe transferred to a higher
level of care. No change in need from CM.

## 2023-02-23 NOTE — NUR
PER HEPARIN DRIP PROTOCOL, CBC AND PLATELETS LAB
TO BE ORDERED AFTER 24HR START OF
DRIP. ORDERS PLACED BY THIS RN, MD ARREDONDO AND MALCOLM'ED.

## 2023-02-23 NOTE — NUR
RESULTS OF aPTT SHOW 62.1. PER HEPARIN DRIP PROTOCOL,
NO TITRATION NEEDED AND HEPARIN
DRIP REMAINS INFUSING @ 1400 UNITS/HR PER PROTOCOL, HEPARIN PUMP SETTINGS
VERIFIED BY PRIMARY RN ZENA ALONG WITH THIS RN AND HOUSE SUPERVISOR ALBERT.
PRIMARY RN REMAINS IN ROOM FOR ASSESSMENT. PRIMARY RN TO PLACE ORDER FOR aPTT
FOR 0520 THIS MORNING PER PROTOCOL.

## 2023-02-23 NOTE — NUR
ROUNDED ON pt, pt CONTINUES TO REST IN BED WITH EYES CLOSED, RR EVEN AND
UNLABORED. CALL LIGHT IN REACH AND BED ALARM IN PLACE FOR SAFETY.

## 2023-02-23 NOTE — NUR
ROUNDED ON pt, pt RESTING IN BED WITH EYES CLOSED, ON RA. RR EVEN AND
UNLABORED. BED ALARM ON AND CALL LIGHT IN REACH.

## 2023-02-23 NOTE — NUR
REPORT RECEIVED FROM NIGHT RN AND PT CARE RESUMED. PT. IS DROWSY BUT AWAKENS
TO VOICE. ORIENTED TO ALL BUT DATE. ASSESSMENT COMPLETED. PT. C/O DOUBLE
VISION AT TIMES, BUT OTHER WISE NO NEW NEURO SX NOTED. IV LEAKING AND
REMOVED WITH CATH INTACT. PT. ASSISTED WITH SITTING AT EOB FOR BREAKFAST. SHE
REFUSES TO MOVE TO CHAIR. LEFT RESTING WITH CALL LIGHT IN REACH.

## 2023-02-23 NOTE — NUR
REPORT RECEIVED FROM BONY GAO. PT LAYING IN BED WITH EYES CLOSED. PT AWAKENS
WHEN ADDRESSED AND LIGHT TOUCH TO SHOULDER. PT REPORTS NO OTHER NEEDS AT THIS
TIME. CALL LIGHT IN REACH. BED ALARM ON.

## 2023-02-23 NOTE — NUR
SPOKE AND CLARIFIED WITH DR APPLE REGARDING HEPARIN DRIP PROTOCOL, IF HEPARIN
BOLUS IS INDICATED PER LABS/PROTOCOL TO GIVE BOLUS.

## 2023-02-23 NOTE — NUR
PT. UPDATED ON POC AND TRANSFER PLAN. DISCUSSED S/SX TO ALERT RN ABOUT. PT.
VOICED UNDERSTANDING  WILL CONTINUE TO MONITOR.

## 2023-02-23 NOTE — NUR
IN TO ROUND ON PT. PT SEMI-FOWLERS WITH EYES CLOSED. RR EVEN AND UNLBAORED. NO
NEEDS IDENTIFIED AT THIS TIME. CALL LIGHT IN REACH. BED ALARM ON.

## 2023-02-23 NOTE — NUR
PT. WEIGHT OBTAINED FOR HEPARIN DRIP. PT. QUESTIONS ANSWERED. DENIES NEEDS AT
THIS TIME. LEFT RESTING WITH CALL LIGHT IN REACH.

## 2023-02-23 NOTE — NUR
IN WITH MARVIN MCFARLANE RN TO VERIFY PUMP SETTINGS.
NEURO ASSESSMENT COMPLETE. PT A&O TO SELF, MONTH, YEAR AND PLACE.
REDNESS TO RLE WITH MOTTELING NOTED. PT SENSATION INTACT. PEDAL PULSE NOT
PALPABLE.
PT DENIES ANY OTHER NEEDS AT THIS TIME. CALL LIGHT IN REACH. BED ALARM ON.

## 2023-02-23 NOTE — NUR
In and spoke with Rhonda.  She immediately lets me know she does not want
placement. I let her know, I am just checking to make sure she has everything
she needs. She denies needs, states she would consider a commode and a walker.
She is aware of [facility] as she is not sure she would use.  She plans on dc
to home with her sister when cleared medically.  She is not medically cleared
as of today.

## 2023-02-23 NOTE — NUR
in room with us for r leg scan - cold foot, pain in r shin,
- pt iv left ac is laying in bed - turned off pump -supervisor called to place
new iv. RN assisted US tech to call Dr. Newton to update on us and waiting on
official report. pt resting in bed - gabapentin given per order.

## 2023-02-23 NOTE — NUR
PT. C/O ONGOING RLE PAIN. LIMB ASSESSED. RLE NOW DUSKY, COOL, POOR CAP REFILL
COMPARED TO EARLIER ASSESSMENT AND LLE. DOPPLER USED AND UNABLE TO FIND PEDAL
OR TIBIAL PULSES. CHARGE CONFIRMED. MD UPDATED AND ORDER GIVEN TO MONITOR AND
IMAGING.

## 2023-02-23 NOTE — NUR
pt HAD UNEVENTFUL NIGHT, SLEPT FOR MOST OF THE NIGHT. SBA WITH FWW, BED ALARM
ON FOR SAFETY. VSS, pt SALINE LOCKED. ADA DIET WITH SCHEDULED ACCUCHECKS,
BLOOD SUGARS ELEVATED AND MD AWARE OF HS CHECK .

## 2023-02-23 NOTE — NUR
ROUNDED ON pt WHILE SLEEPING IN BED, EYES CLOSED AND REMAINS ON RA. RR EVEN
AND UNLABORED, NO DISTRESS NOTED. BED ALARM ON AND CALL LIGHT IN REACH.

## 2023-02-23 NOTE — NUR
prn accucheck remains elevated but trending down, result in the 280's. pt a/o
to all but date- easily reoriented. no acute changes to assessment, call light
in reach and bed alarm on. pt denies need to void.

## 2023-02-23 NOTE — NUR
IN TO ADMINISTER MEDICATIONS, SEE MAR. PT SITTING UP IN BED WATCHING TV. PT
TAKES PO MEIDCATIONS WITH NO ISSUES.
ASSESSMENT COMPLETE. LUNG SOUNDS DIMINISHED IN RLL AND LLL. CLEAR IN RUL AND
HARVINDER. PT REPORTING PAIN 10/10 IN RLE.
BOWEL TONES ACTIVE.
VITALS AND I&OS COMPLETE.
IV INFUSING WNL.
PT DENIES ANY OTHER NEEDS AT THIS TIME. CALL LIGHT IN REACH. BED ALARM ON.

## 2023-02-23 NOTE — NUR
RLE ASSESSED. SENSATION INTACT. REMAINS COOL AND RED. PT. DENIES PAIN. NO
OTHER NEW NEURO DEFICITS NOTED. WILL CONTINUE TO MONITOR.

## 2023-02-24 NOTE — NUR
SPOKE WITH MIQUEL AGUIAR FROM TELEPHARMACY TO CONFIRM WEIGHT
CALCULATIONS ON NEW DVT HEPARIN PROTOCOL, PER MIQUEL AGUIAR WEIGHT CALCULATIONS
ARE CORRECT
AND SHE RECOMMENDS CONTINUING TO FOLLOW HEPARIN DRIP PER PROTOCOL AND TITRATE
HEPARIN DRIP TO 1500 UNITS/HR. PRIMARY RN UPDATE AND IN ROOM TO TITRATE AS
DIRECTED.

## 2023-02-24 NOTE — NUR
report received. pt in bed awake and oriented reading a book. reports pain
8/10 since pain medicaitons. call light in reach. denies needs.

## 2023-02-24 NOTE — NUR
IN TO ROUND ON PT. PT LAYING SEMI-FOWLERS IN BED WITH EYES CLOSED. RR EVEN AND
UNLABORED. IV INFUSING WNL.
NO NEEDS IDENTIFIED AT THIS TIME. CALL LIGHT IN REACH. BED ALARM ON.

## 2023-02-24 NOTE — NUR
ASSESSMENT COMPLETED PT. C/O RLE PAIN. EXTREMITY ASSESSED. TIBIAL PULSE FOUND
WITH DOPPLER. PEDAL PULSE STILL ABSENT. SENSATION INTACT AND EXTREMITY IS
STILL MOTTLED AT FOOT AND COOL. NEPHEW IN TO VISIT.

## 2023-02-24 NOTE — NUR
PT. aPTT LABS ARE 1215 ARE AT THERAPEUTIC AND DO NOT REQUIRE TITRATION PER
MATRIX. MD UPDATED AND VERBAL ORDER GIVEN NOT TO RECHECK PTT LABS UNTIL THE
A.M.

## 2023-02-24 NOTE — NUR
PATIENT IS WATCHING TV. PATIENT IS ON THE WAITLIST FOR [facility] OR. FOR
CONTINUED CARE FOR ACUTE THROMBUS RIGHT LOWER LEG. PATIENT STATES HER
AGROA-PHOBIA IS WELL CONTROLLED WHILE IN THE HOSPITAL. PATIENT STATES SHE HAS
SUPPORTIVE FAMILY FOR FUTURE DISCHARGE NEEDS.

## 2023-02-24 NOTE — NUR
clarified aptt goal with dr kaur, verbal read back to
confirm aptt goal is 60-90
seconds. spoke with won in pharmacy, won to customize heparin scale. per
won, okay to leave heparin drip at current rate of 1500 units/hr until next
lab draw.

## 2023-02-24 NOTE — NUR
PT ON PHONE, SITTING IN CHAIR. HAD BRIEF VISIT, PT THANKED ME FOR COMING. SHE
REMEMBERED ME FROM PREVIOUS ADMISSION. WILL FOLLOW

## 2023-02-24 NOTE — NUR
IN TO ROUND ON PT. PT LAYING IN BED SEMI-FOWLERS WITH EYES CLOSED. RR EVEN AND
UNLABORED.
NO NEEDS IDENTIFIED AT THIS TIME. CALL LIGHT IN REACH. BED ALARM ON.

## 2023-02-24 NOTE — NUR
IN TO ROUND ON PT. PT LAYING ON RIGHT SIDE. RR EVEN AND UNLABORED. IV INFUSING
WNL.
NO NEEDS IDENTIFIED AT THIS TIME. CALL LIGHT IN REACH. BED ALARM ON.

## 2023-02-24 NOTE — NUR
PTT AT 1215 45.6, HEPARIN BOLUS OF 5000 UNITS AND INCREASE OF HEPARIN DRIP BY
200 UNITS/HR TO 1700 UNITS/HR. VERIFIED BY [facility] AND CO SIGNED BY
GARY GRESHAM RN. PTT ORDERED PLACED FOR 6 HOURS, 1915.

## 2023-02-24 NOTE — NUR
IN AS IV PUMP ALARMING, RESOLVED.
ASSESSMENT COMPLETE. LUNG SOUNDS CLEAR IN RUL AND HARVINDER. DIMINISHED IN RLL AND
LLL. BOWEL TONES ACTIVE. PT DENIES PAIN AT THIS TIME. RLE REDNESS NOTED WITH
SOME MOTTELING. RLE COOL TO TOUCH. PEDAL PULSE NOT PALPABLE.
PT A&O TO SELF, MONTH, YEAR, PLACE AND DAY.
IV INFUSING WNL.
PT DENIES ANY OTHER NEEDS AT THIS TIME. CALL LIGHT IN REACH. BED ALARM ON.

## 2023-02-24 NOTE — NUR
THIS RN TALKED WITH DR. APPLE TO CLARIFY PTs HEPARIN DOSING ORDERS. UPDATED
ORDERS PER PROTOCAL, VERIFIED WITH READBACK.

## 2023-02-24 NOTE — NUR
REPORT RECEIVED FROM NIGHT RN AND PT CARE RESUMED. PT. IS ALERT AND ORIENTED
TO ALL. SHE REPORTS 3/10 PAIN IN RLE. ASSISTED WITH REPOSITIONING. DOPPLER
USED TO ASSESS PULSES IN RLE. TIBIAL PULSE PRESENT, BUT IRREGULAR. PEDAL PULSE
ABSENT. RLE IS CYANOTIC, PAINFUL AND COOL. SENSATION INTACT IN ALL
EXTREMITITES AT THIS TIME. ASSESSMENT COMPLETED. HEPARIN DRIP CHECKED BY 2
RNS. STANDING WEIGHT OBTAINED. CALL LIGHT IN REACH AND ALARM ON.

## 2023-02-24 NOTE — NUR
IN TO TITRATE HEPARIN DRIP WITH NANCI BERRY RN. TITRATED TO 1500 UNITS/HR.
PT REPORTING PAIN 10/10 IN RLE. PRN TYLENOL ADMINISTERED, SEE MAR. PT TAKES PO
MEDICATION WITH NO ISSUES.
PT REPORTS NO OTHER NEEDS AT THIS TIME. CALL LIGHT IN REACH. BED ALARM ON.

## 2023-03-04 NOTE — XMS
PreManage Notification: NATHANIEL SALINAS MRN:[id number]
 
Security Information
 
Security Events
No recent Security Events currently on file
 
 
 
CRITERIA MET
------------
- [facility]
- [facility] - 2 Visits in 30 Days
 
 
CARE PROVIDERS
-------------------------------------------------------------------------------------
MARILYN COBOS      Internal Medicine: Cardiovascular Disease     12/16/2019-Current
 
PHONE: Unknown
-------------------------------------------------------------------------------------
GAYE CHILDRESS      Physician Assistant     12/21/2021-Current
 
PHONE: [phone]
-------------------------------------------------------------------------------------
 
Minor has no Care Guidelines for this patient.
Care History
Medical/Surgical
03/31/2021    [facility]
 
      - PATIENT HAS A CARDIOLOGIST DR COBOS AT [facility] CARDIOLOGY- [phone] 
      
      - LAST APT WITH CARDIOLOGIST 08/13/2020 NEXT APT SCHEDULED 04/08/2021 
      - RECENT ED VISIT RECORDS SENT TO CARDIOLOGIST FOR REVIEW PER REQUEST.
03/08/2021    [facility]
 
      - PATIENT WAS LAST SEEN BY PCP JANUARY 2021 AND HAD A VIRTUAL VISIT WITH 
      [facility] FEBRUARY 2021. PROVIDER OFFICE JUST RECEIVED PATIENT RECENT ED VISIT
      NOTE AND WILL CONTACT PATIENT TODAY FOR A FOLLOW UP APT.
E.D. VISIT COUNT (12 MO.)
-------------------------------------------------------------------------------------
5 [facility]
-------------------------------------------------------------------------------------
TOTAL 5
-------------------------------------------------------------------------------------
NOTE: Visits indicate total known visits.
 
ED/UCC VISIT TRACKING (12 MO.)
-------------------------------------------------------------------------------------
03/04/2023 22:47
[facility] [place]
 
TYPE: Emergency
 
COMPLAINT:
- R LEG POST SURGERY ISSUE AND CONFUSION
-------------------------------------------------------------------------------------
02/09/2023 13:44
 
[facility] [place]
 
TYPE: Emergency
 
COMPLAINT:
- COUGH
 
DIAGNOSES:
- Cough, unspecified
- Other long term (current) drug therapy
- Essential (primary) hypertension
- Allergy status to sulfonamides
- Long term (current) use of aspirin
- Long term (current) use of insulin
- Sleep apnea, unspecified
- Type 2 diabetes mellitus without complications
- Allergy status to other drugs, medicaments and biological substances
- Nicotine dependence, unspecified, uncomplicated
- Chronic obstructive pulmonary disease with (acute) exacerbation
-------------------------------------------------------------------------------------
02/03/2023 10:37
[facility] [place]
 
TYPE: Emergency
 
COMPLAINT:
- COUGH, CHEST PRESSURE, DIZZY
 
DIAGNOSES:
- Unspecified asthma, uncomplicated
- Long term (current) use of aspirin
- Allergy status to other drugs, medicaments and biological substances
- Other long term (current) drug therapy
- Sleep apnea, unspecified
- Unspecified atrial fibrillation
- Cough, unspecified
- Allergy status to sulfonamides
- Nicotine dependence, unspecified, uncomplicated
- Acute upper respiratory infection, unspecified
- Long term (current) use of anticoagulants
- Long term (current) use of insulin
- Type 2 diabetes mellitus without complications
- Contact with and (suspected) exposure to COVID-19
- Essential (primary) hypertension
-------------------------------------------------------------------------------------
05/19/2022 03:15
[facility] [place]
 
TYPE: Emergency
 
COMPLAINT:
- POST OP PROBLEM
 
DIAGNOSES:
- Type 2 diabetes mellitus without complications
- Long term (current) use of aspirin
- Other intraoperative and postprocedural complications and disorders of the
musculoskeletal system
- Long term (current) use of insulin
- Allergy status to other drugs, medicaments and biological substances
 
- Sleep apnea, unspecified
- Essential (primary) hypertension
- Nicotine dependence, unspecified, uncomplicated
- Unspecified atrial fibrillation
- Unspecified asthma, uncomplicated
- Restless legs syndrome
- Other long term (current) drug therapy
- Allergy status to sulfonamides
- Long term (current) use of anticoagulants
- Other complications of procedures, not elsewhere classified, initial encounter
-------------------------------------------------------------------------------------
05/04/2022 09:35
[facility] [place]
 
TYPE: Emergency
 
COMPLAINT:
- POST SURGERY BLEEDING
 
DIAGNOSES:
- Long term (current) use of oral hypoglycemic drugs
- Long term (current) use of aspirin
- Type 2 diabetes mellitus without complications
- Unspecified atrial fibrillation
- Nicotine dependence, unspecified, uncomplicated
- Long term (current) use of insulin
- Essential (primary) hypertension
- Long term (current) use of anticoagulants
- Sleep apnea, unspecified
- Postprocedural hemorrhage of a musculoskeletal structure following a
musculoskeletal system procedure
- Other long term (current) drug therapy
- Allergy status to sulfonamides
-------------------------------------------------------------------------------------
 
 
INPATIENT VISIT TRACKING (12 MO.)
-------------------------------------------------------------------------------------
[facility]
 
TYPE: Internal Medicine
 
DIAGNOSES:
- vascular compromise to right lower extremity
- Other disorder of circulatory system
- Pain in leg, unspecified
- Cerebral infarction, unspecified
-------------------------------------------------------------------------------------
02/22/2023 15:12
[facility]
 
TYPE: Medical Surgical
 
COMPLAINT:
- CVA
 
DIAGNOSES:
- Long term (current) use of systemic steroids
- Nicotine dependence, cigarettes, uncomplicated
 
- Allergy status to other drugs, medicaments and biological substances
- Presence of left artificial knee joint
- Other specified postprocedural states
- Hypo-osmolality and hyponatremia
- Restless legs syndrome
- Cerebral infarction, unspecified
- Allergy status to sulfonamides
- Arthrodesis status
- Long term (current) use of anticoagulants
- Acquired absence of other specified parts of digestive tract
- Chronic obstructive pulmonary disease, unspecified
- History of uterine scar from previous surgery
- Acute kidney failure, unspecified
- Long term (current) use of anticoagulants
- Occlusion and stenosis of bilateral carotid arteries
- Restless legs syndrome
- Chronic obstructive pulmonary disease, unspecified
- Other long term (current) drug therapy
- Other long term (current) drug therapy
- Contact with and (suspected) exposure to COVID-19
- Acquired absence of other specified parts of digestive tract
- Type 2 diabetes mellitus with diabetic peripheral angiopathy without gangrene
- Allergy status to other drugs, medicaments and biological substances
- Hyperlipidemia, unspecified
- Obstructive sleep apnea (adult) (pediatric)
- Long term (current) use of systemic steroids
- Long term (current) use of insulin
- Do not resuscitate
- Contact with and (suspected) exposure to COVID-19
- Presence of left artificial knee joint
- Long term (current) use of insulin
- Essential (primary) hypertension
- Stricture of artery
- Do not resuscitate
- Hypo-osmolality and hyponatremia
- Occlusion and stenosis of bilateral carotid arteries
- Type 2 diabetes mellitus with diabetic peripheral angiopathy without gangrene
- Atherosclerosis of native arteries of extremities with rest pain, right leg
- Presence of aortocoronary bypass graft
- Other specified postprocedural states
- Nicotine dependence, cigarettes, uncomplicated
- Chronic atrial fibrillation, unspecified
- Arthrodesis status
- History of uterine scar from previous surgery
- Stricture of artery
- Allergy status to sulfonamides
- Obstructive sleep apnea (adult) (pediatric)
- Acute kidney failure, unspecified
- Type 2 diabetes mellitus with hyperglycemia
- Atherosclerosis of native arteries of extremities with rest pain, right leg
- Essential (primary) hypertension
- Presence of aortocoronary bypass graft
- Type 2 diabetes mellitus with hyperglycemia
- Chronic atrial fibrillation, unspecified
- Hyperlipidemia, unspecified
-------------------------------------------------------------------------------------
05/17/2022 13:50
[facility] [place]
 
TYPE: Surgery
 
 
COMPLAINT:
- SHOULDER ARTHROPLASTY, TOTAL, REVISION
 
DIAGNOSES:
- SHOULDER ARTHROPLASTY, TOTAL, REVISION
-------------------------------------------------------------------------------------
05/03/2022 06:50
[facility] [place]
 
TYPE: Surgery
 
DIAGNOSES:
- Primary osteoarthritis, right shoulder
-------------------------------------------------------------------------------------
 
[email or web address]

## 2024-03-30 NOTE — NUR
Peripheral IV    Patient location during procedure: OR  Start time: 3/30/2024 8:03 AM  End time: 3/30/2024 8:05 AM  Line placed for Fluids/Medication Admin and Sedation.  Performed By   Anesthesiologist: Tomás Hunt MD  Preanesthetic Checklist  Completed: patient identified, IV checked, site marked, risks and benefits discussed, surgical consent, monitors and equipment checked, pre-op evaluation and timeout performed  Peripheral IV Prep   Patient position: supine   Prep: ChloraPrep  Patient monitoring: heart rate, cardiac monitor and continuous pulse ox  Peripheral IV Procedure   Laterality:left  Location:  Forearm  Catheter size: 18 G          Post Assessment   Dressing Type: tape and transparent.    IV Dressing/Site: clean, dry and intact             rounded on pt following recent report of pain in ble, pt was resting in bed
with eyes closed. on ra, rr even and unlabored, no outward distress or signs
of pain noted, bed alarm remains on and call light in reach.